# Patient Record
(demographics unavailable — no encounter records)

---

## 2025-01-10 NOTE — ASSESSMENT
[FreeTextEntry1] : Urine culture sent Hold off on prophylaxis for now unless urine culture is positive f/u 1 year, or sooner if needed

## 2025-01-10 NOTE — HISTORY OF PRESENT ILLNESS
[FreeTextEntry1] : 71y/o woman Hx of recurrent UTI. Was on Macrobid for UTI prophylaxis No UTI's since stopping macrobid prophylaxis, until this past year 2024 when she had 3 infections.  This coincided with flare of her irritable bowel symptoms due to the sudden death of her  and sleep.  She is under better control with her diabetes with GLP-1's She has not had any further infections of late  PVR = 0 cc

## 2025-05-12 NOTE — DATA REVIEWED
[MRI] : MRI [Shoulder] : shoulder [I independently reviewed and interpreted images and report] : I independently reviewed and interpreted images and report [Report was reviewed and noted in the chart] : The report was reviewed and noted in the chart [I reviewed the films/CD] : I reviewed the films/CD [FreeTextEntry1] : ([facility] 1/23/25)  Moderate to severe supraspinatus tendinosis with a full-thickness tear at the central to posterior aspect of the insertion and mild to moderate articular sided tearing extending more proximally. Moderate infraspinatus tendinosis with a shallow articular sided tear and linear intrasubstance extension proximally. Mild adhesive capsulitis. Moderate AC joint arthrosis.

## 2025-05-12 NOTE — HISTORY OF PRESENT ILLNESS
[de-identified] : 05/12/2025: The patient is a 70 year old F, who presents today complaining of R>L shoulder pain Date of Injury/Onset: R shoulder started a year ago, atraumatic onset. Progressively worse since Sept/Oct.  Then she had a fall onto her knees in March developed left shoulder pain Pain:    At Rest: 0/10 With Activity:  4-5/10 Mechanism of injury: ***none This is [not] a Work Related Injury being treated under Worker's Compensation. This is [not] an athletic injury occurring associated with an interscholastic or organized sports team. Quality of symptoms: aching nature to her elbow, posterior shoulder blade Improves with: not using her R arm Worse with: ***activity dependent, QHS, she cannot sleep on her right shoulder Prior treatment: PT x 2 months, Nov/Dec, Advil 400mg prn daily helps Prior imaging: xrays at another ortho (Dr. 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

## 2025-05-12 NOTE — DISCUSSION/SUMMARY
[de-identified] : Bilateral X-Ray Examination of the SHOULDER (2 views): no fractures, subluxations or dislocations.  Bilateral X-Ray Examination of the SCAPULA 1 or 2 views shows: No significant abnormalities. Acromial spur   Assessment:   The patient is a 70 year old female with physical exam findings consistent with BILATERAL SHOULDER IMPINGEMENT, and RIGHT SHOULDER RTCT   - We discussed their diagnosis and treatment options at length including the risks and benefits of both surgical and non-surgical options. - Advised patient she is a candidate for a RCR. She expressed understanding and all questions were answered.  - We will continue conservative treatment with icing, and anti-inflammatory medication. - Patient was given a prescription for Naproxen. They will take it for the next week and then on an as needed basis, as long as there are no medical contra-indications. Patient is counseled on possible GI, renal, and cardiovascular side effects. - The patient was advised to let pain guide the gradual advancement of activities. - Discussed possibility of CSI in the future.   Lengthy discussion was had with the patient regarding their options.  Patient understands that not having the rotator cuff repaired in a timely fashion will possibly worsen their chances of having a good outcome.  They also understand that the cuff may not even be repairable if they delay surgery.  Pt understands and all questions were answered.    Follow up: as needed in 8 weeks

## 2025-05-12 NOTE — IMAGING
[de-identified] : RIGHT SHOULDER Inspection: No swelling.  Palpation: Tenderness is noted at the bicipital groove, anterior and lateral.  Range of motion: There is pain with range of motion. , ER 55, @90ER 90, @90IR 30 Strength: There is pain and discomfort with strength testing. Forward Flexion 5-/5. Abduction 5-/5. External Rotation 5-/5 and Internal Rotation 5/5 with discomfort   Neurological testings: motor and sensor intact distally. Ligament Stability and Special Tests:  There is positive arc of pain.  Shoulder apprehension: neg Shoulder relocation: neg Obriens test: pos Biceps Active test: neg Hall Labral Shear: neg Impingement testing: pos Johan testing: pos Whipple: pos Cross Body Adduction: neg  LEFT SHOULDER Inspection: No swelling.  Palpation: Tenderness is noted at the bicipital groove, anterior and lateral.  Range of motion: There is pain with range of motion. , ER 55, @90ER 90, @90IR 30 Strength: There is pain and discomfort with strength testing. Forward Flexion 5-/5. Abduction 5-/5. External Rotation 5-/5 and Internal Rotation 5/5   Neurological testings: motor and sensor intact distally. Ligament Stability and Special Tests:  There is positive arc of pain.  Shoulder apprehension: neg Shoulder relocation: neg Obriens test: pos Biceps Active test: neg Hall Labral Shear: neg Impingement testing: pos Johan testing: pos Whipple: pos Cross Body Adduction: neg

## 2025-06-12 NOTE — ASSESSMENT
[FreeTextEntry1] : Pt went to the ER last Monday was prescribed cefpodoxime for her UTI symptoms. Urine culture from [facility] resulted negative. Had diarrhea around 5/26/25 , dizziness episode due to dehydration - went to ED at [place] and evaluated, unsure if stool cx were performed for C diff . CT AP with contrast 5/26/25- No renal stones, mass or hydronephrosis. GI findings ? colitis/ileitis  Pt pending MR Abdomen results for a pancreatic cystic lesion finding. UTI like ss started ?5/21/25    PMH - Rec UTI , IBS - diarrhea predominant was on Macrobid prophylaxis in 2023 and was doing  On Ozempic for past 1 year with desired weight loss   Fluids - 66 - 80 oz of water DTF q 2h, nocturia X2 - No UI States she doesn't feel like she is emptying out completely. Experiencing urinary frequency and bladder pressure/urgency. PVR 23 ml  - urethral caruncle small, Vaginal atrophy, mild cystocele stage 1 , mild rectocele stage 1 trial of Vaginal estrace daily 1 gram at HS X 2 weeks, then 1  gram Three times a week at HS Apply pea sized amount of estrace cream to urethra daily at HS x 2 weeks  Pyridium prn q 8 h for dysuria   Script for urine cx as she is travelling to [place] next month  Nitrofurantoin for her upcoming trip in July  UA microscopic analysis and cx  Follow up with Dr Bardales  Pt has not had a Cystoscopy for rec UTI work up - Refer to Dr Bardales    [Rectocele (618.04\N81.6)] : primary [Urinary Symptom or Sign (788.99\R39.89)] : implantation [Urinary Tract Infection (599.0\N39.0)] : qualitative ~C was positive

## 2025-06-12 NOTE — PHYSICAL EXAM
[Normal Appearance] : normal appearance [Well Groomed] : well groomed [General Appearance - In No Acute Distress] : no acute distress [Edema] : no peripheral edema [Respiration, Rhythm And Depth] : normal respiratory rhythm and effort [Exaggerated Use Of Accessory Muscles For Inspiration] : no accessory muscle use [Abdomen Soft] : soft [Costovertebral Angle Tenderness] : no ~M costovertebral angle tenderness [Abdomen Tenderness] : non-tender [Urinary Bladder Findings] : the bladder was normal on palpation [External Female Genitalia] : normal external genitalia [Normal Station and Gait] : the gait and station were normal for the patient's age [] : no rash [No Focal Deficits] : no focal deficits [Oriented To Time, Place, And Person] : oriented to person, place, and time [Affect] : the affect was normal [Mood] : the mood was normal [de-identified] : urethral caruncle,Vag atrophy, mild cystocele stage 1  and rectocele stage 1 [Chaperoned Physical Exam] : A chaperone was present in the examining room during all aspects of the physical examination.

## 2025-06-12 NOTE — HISTORY OF PRESENT ILLNESS
[FreeTextEntry1] : Pt went to the ER last Monday was prescribed cefpodoxime for her UTI symptoms. Urine culture from [facility] resulted negative. Had diarrhea around 5/26/25 , dizziness episode due to dehydration - went to ED at [place] and evaluated, unsure if stool cx were performed for C diff . CT AP with contrast 5/26/25- No renal stones, mass or hydronephrosis. GI findings ? colitis/ileitis  Pt pending MR Abdomen results for a pancreatic cystic lesion finding. UTI like ss started ?5/21/25  Not seen Gyn since 21 years ago when she underwent Total abd Hysterectomy for fibroids and had a bladder lift at that time and was doing fine until past 2 weeks with change in Voiding pattern . Pt presents today with cc of dysuria , vaginal burning and increased freq/urgency , report alternating  stream ,intermittency ,post void dribbling, inc bladder emptying sensation post void. Denies fevers/chills no n/V, no hematuria   PMH - Rec UTI , IBS - diarrhea predominant was on Macrobid prophylaxis in 2023 and was doing  On Ozempic for past 1 year with desired weight loss   Fluids - 66 - 80 oz of water DTF q 2h, nocturia X2 - No UI States she doesn't feel like she is emptying out completely. Experiencing urinary frequency and bladder pressure/urgency. PVR 23 ml  - urethral caruncle small, Vaginal atrophy, mild cystocele stage 1 , mild rectocele stage 1 trial of Vaginal estrace daily 1 gram at HS X 2 weeks, then 1  gram Three times a week at HS Apply pea sized amount of estrace cream to urethra daily at HS x 2 weeks  Pyridium prn q 8 h for dysuria   Script for urine cx as she is travelling to [place] next month  Nitrofurantoin for her upcoming trip in July  UA microscopic analysis and cx  Follow up with Dr Bardales  Pt has not had a Cystoscopy for rec UTI work up - Refer to Dr Bardales